# Patient Record
Sex: FEMALE | Race: OTHER | Employment: UNEMPLOYED | URBAN - METROPOLITAN AREA
[De-identification: names, ages, dates, MRNs, and addresses within clinical notes are randomized per-mention and may not be internally consistent; named-entity substitution may affect disease eponyms.]

---

## 2024-03-12 ENCOUNTER — APPOINTMENT (OUTPATIENT)
Dept: ULTRASOUND IMAGING | Age: 19
End: 2024-03-12
Payer: COMMERCIAL

## 2024-03-12 ENCOUNTER — HOSPITAL ENCOUNTER (OUTPATIENT)
Age: 19
Discharge: HOME | End: 2024-03-12
Attending: OBSTETRICS & GYNECOLOGY | Admitting: OBSTETRICS & GYNECOLOGY
Payer: COMMERCIAL

## 2024-03-12 VITALS
RESPIRATION RATE: 18 BRPM | TEMPERATURE: 98.4 F | SYSTOLIC BLOOD PRESSURE: 124 MMHG | DIASTOLIC BLOOD PRESSURE: 66 MMHG | HEART RATE: 85 BPM | OXYGEN SATURATION: 98 %

## 2024-03-12 DIAGNOSIS — O46.90 VAGINAL BLEEDING DURING PREGNANCY: Primary | ICD-10-CM

## 2024-03-12 LAB
ABO/RH: NORMAL
ANTIBODY SCREEN: NEGATIVE
GONADOTROPIN, CHORIONIC (HCG) QUANT: NORMAL UIU/ML

## 2024-03-12 PROCEDURE — 99214 OFFICE O/P EST MOD 30 MIN: CPT | Performed by: ADVANCED PRACTICE MIDWIFE

## 2024-03-12 PROCEDURE — 84702 CHORIONIC GONADOTROPIN TEST: CPT

## 2024-03-12 PROCEDURE — 86850 RBC ANTIBODY SCREEN: CPT

## 2024-03-12 PROCEDURE — 86901 BLOOD TYPING SEROLOGIC RH(D): CPT

## 2024-03-12 PROCEDURE — 99213 OFFICE O/P EST LOW 20 MIN: CPT

## 2024-03-12 PROCEDURE — 1220000000 HC SEMI PRIVATE OB R&B

## 2024-03-12 PROCEDURE — 86900 BLOOD TYPING SEROLOGIC ABO: CPT

## 2024-03-12 PROCEDURE — 93975 VASCULAR STUDY: CPT

## 2024-03-12 PROCEDURE — 76801 OB US < 14 WKS SINGLE FETUS: CPT

## 2024-03-12 PROCEDURE — 6370000000 HC RX 637 (ALT 250 FOR IP): Performed by: ADVANCED PRACTICE MIDWIFE

## 2024-03-12 RX ORDER — PNV NO.95/FERROUS FUM/FOLIC AC 28MG-0.8MG
1 TABLET ORAL DAILY
Qty: 30 TABLET | Refills: 3 | Status: SHIPPED | OUTPATIENT
Start: 2024-03-12

## 2024-03-12 RX ORDER — ACETAMINOPHEN 500 MG
1000 TABLET ORAL EVERY 8 HOURS PRN
Status: DISCONTINUED | OUTPATIENT
Start: 2024-03-12 | End: 2024-03-12 | Stop reason: HOSPADM

## 2024-03-12 RX ADMIN — ACETAMINOPHEN 1000 MG: 500 TABLET ORAL at 12:22

## 2024-03-12 NOTE — FLOWSHEET NOTE
Pt presents amb to unit for c/o vaginal bleeding that started yesterday. Pt states had strong period like cramping yesterday with bleeding and clots. Bleeding isnt as heavy today. Denies c/o cramping today only has headache. Has had US done in [place]. POC discussed. Alma WARD notified of pt arrival.

## 2024-03-12 NOTE — FLOWSHEET NOTE
Discharge instructions given and explained. Instructed to follow up with the [facility]. Information given with address and phone number for [facility]. PT to return for increased vaginal bleeding. Pt verbalized understanding and left amb from unit for home without distress.

## 2024-03-12 NOTE — CONSULTS
Session ID: [id number]  Language: Divehi   ID: #[id number]   Name: Shmuel      MAYTE malone. Notified waiting on US results. States may not have  for much longer. Will notify CNM.

## 2024-03-12 NOTE — PROGRESS NOTES
hemorrhage.     ASSESSMENT:       17 yo  at 9.4 weeks  Rh +   Subchorionic hematoma   Vaginal bleeding in pregnancy    PLAN:    Discussed cause of bleeding was WARNER and recommended pelvic rest and f/u u/s in 1 month.     Resources given for [facility]. Prenatal vitamin sent to pharmacy.     Reviewed bleeding precautions. Pt to return to the unit if she develops severe abdominal pain or heavy bleeding saturating a pad/hr or passing clots.     I have collaborated and updated Dr Boyd  and the MD agrees with the current POC.     JUSTIN Hdz CNM

## 2024-03-12 NOTE — DISCHARGE INSTRUCTIONS
Hematoma subcoriónico: Instrucciones de cuidado  Subchorionic Hematoma: Care Instructions  Instrucciones de cuidado     Un hematoma subcoriónico, o hemorragia subcoriónica, es sangrado bajo zita de las membranas (corion) que rodea al embrión dentro del útero. Es zita causa común de sangrado en los comienzos del embarazo.  El principal síntoma es sangrado vaginal. Huyen algunas mujeres no tienen síntomas. Pueden enterarse de que tienen un hematoma isra zita ecografía.  En la mayoría de los casos, el sangrado se va por sí solo. La mayoría de las mujeres terminan teniendo un bebé jose a. Huyen, en algunos casos, el sangrado es zita señal de aborto espontáneo o de otro problema con el embarazo. Es posible que bryant médico quiera hacerle zita ecografía de seguimiento.  La atención de seguimiento es zita parte clave de bryant tratamiento y seguridad. Asegúrese de hacer y acudir a todas las citas, y llame a bryant médico si está teniendo problemas. También es zita buena idea saber los resultados de viola exámenes y mantener zita lista de los medicamentos que kathy.  ¿Cómo puede cuidarse en el hogar?  [place] nota de cualquier sangrado que tenga y siga las instrucciones sobre cuándo llamar a bryant médico.  Tenga en cuenta que tener algo de sangrado isra el primer trimestre o tener un resultado anormal en zita ecografía podría:  No causarles problemas ni a usted ni a bryant bebé.  Resultar ser algo más grave. Huyen si esto sucede, es mejor saberlo pronto. De bree modo, usted y bryant médico pueden manejar cualquier complicación más pronto que tarde.  ¿Cuándo debe pedir ayuda?   Llame al 911 en cualquier momento que considere que necesita atención de urgencia. Por ejemplo, llame si:    Tiene dolor repentino e intenso en el abdomen o en la pelvis.     Se desmayó (perdió el conocimiento).     Tiene sangrado vaginal intenso.   Llame a bryant médico ahora mismo o busque atención médica inmediata si:    Se siente mareada o aturdida, o siente que podría desmayarse.

## 2024-09-24 ENCOUNTER — INITIAL PRENATAL (OUTPATIENT)
Dept: OBGYN | Age: 19
End: 2024-09-24
Payer: COMMERCIAL

## 2024-09-24 VITALS
BODY MASS INDEX: 48.1 KG/M2 | WEIGHT: 245 LBS | SYSTOLIC BLOOD PRESSURE: 108 MMHG | DIASTOLIC BLOOD PRESSURE: 71 MMHG | HEIGHT: 60 IN

## 2024-09-24 DIAGNOSIS — Z98.891 HISTORY OF CESAREAN DELIVERY: ICD-10-CM

## 2024-09-24 DIAGNOSIS — O09.30 LATE PRENATAL CARE: ICD-10-CM

## 2024-09-24 DIAGNOSIS — O99.210 MATERNAL OBESITY, ANTEPARTUM: ICD-10-CM

## 2024-09-24 DIAGNOSIS — Z3A.37 37 WEEKS GESTATION OF PREGNANCY: Primary | ICD-10-CM

## 2024-09-24 DIAGNOSIS — Z87.59 HISTORY OF POSTPARTUM HEMORRHAGE: ICD-10-CM

## 2024-09-24 DIAGNOSIS — O09.93 HIGH-RISK PREGNANCY IN THIRD TRIMESTER: ICD-10-CM

## 2024-09-24 LAB — GLUCOSE 1H P 50 G GLC PO SERPL-MCNC: 126 MG/DL

## 2024-09-24 PROCEDURE — 99204 OFFICE O/P NEW MOD 45 MIN: CPT

## 2024-09-24 PROCEDURE — 36415 COLL VENOUS BLD VENIPUNCTURE: CPT

## 2024-09-24 SDOH — ECONOMIC STABILITY: FOOD INSECURITY: WITHIN THE PAST 12 MONTHS, THE FOOD YOU BOUGHT JUST DIDN'T LAST AND YOU DIDN'T HAVE MONEY TO GET MORE.: NEVER TRUE

## 2024-09-24 SDOH — ECONOMIC STABILITY: INCOME INSECURITY: HOW HARD IS IT FOR YOU TO PAY FOR THE VERY BASICS LIKE FOOD, HOUSING, MEDICAL CARE, AND HEATING?: NOT HARD AT ALL

## 2024-09-24 SDOH — ECONOMIC STABILITY: FOOD INSECURITY: WITHIN THE PAST 12 MONTHS, YOU WORRIED THAT YOUR FOOD WOULD RUN OUT BEFORE YOU GOT MONEY TO BUY MORE.: NEVER TRUE

## 2024-09-24 ASSESSMENT — PATIENT HEALTH QUESTIONNAIRE - PHQ9
SUM OF ALL RESPONSES TO PHQ9 QUESTIONS 1 & 2: 0
1. LITTLE INTEREST OR PLEASURE IN DOING THINGS: NOT AT ALL
SUM OF ALL RESPONSES TO PHQ QUESTIONS 1-9: 0
2. FEELING DOWN, DEPRESSED OR HOPELESS: NOT AT ALL
SUM OF ALL RESPONSES TO PHQ QUESTIONS 1-9: 0

## 2024-09-24 ASSESSMENT — ENCOUNTER SYMPTOMS
DIARRHEA: 0
CONSTIPATION: 0
SHORTNESS OF BREATH: 0
ABDOMINAL PAIN: 0
RESPIRATORY NEGATIVE: 1
NAUSEA: 0
CHEST TIGHTNESS: 0
VOMITING: 0
GASTROINTESTINAL NEGATIVE: 1

## 2024-09-26 ENCOUNTER — PREP FOR PROCEDURE (OUTPATIENT)
Dept: OBGYN | Age: 19
End: 2024-09-26

## 2024-09-26 DIAGNOSIS — Z98.891 S/P REPEAT LOW TRANSVERSE C-SECTION: ICD-10-CM

## 2024-09-28 LAB
GP B STREP SPEC QL CULT: ABNORMAL
ORGANISM: ABNORMAL

## 2024-10-01 ENCOUNTER — ROUTINE PRENATAL (OUTPATIENT)
Dept: OBGYN | Age: 19
End: 2024-10-01
Payer: COMMERCIAL

## 2024-10-01 DIAGNOSIS — O09.93 SUPERVISION OF HIGH-RISK PREGNANCY, THIRD TRIMESTER: Primary | ICD-10-CM

## 2024-10-01 DIAGNOSIS — O99.820 GROUP B STREPTOCOCCAL CARRIAGE COMPLICATING PREGNANCY: ICD-10-CM

## 2024-10-01 DIAGNOSIS — O09.33 LATE PRENATAL CARE AFFECTING PREGNANCY IN THIRD TRIMESTER: ICD-10-CM

## 2024-10-01 DIAGNOSIS — O34.219 PREVIOUS CESAREAN DELIVERY AFFECTING PREGNANCY: ICD-10-CM

## 2024-10-01 DIAGNOSIS — Z87.59 HISTORY OF POSTPARTUM HEMORRHAGE: ICD-10-CM

## 2024-10-01 DIAGNOSIS — O99.210 OBESITY IN PREGNANCY: ICD-10-CM

## 2024-10-01 DIAGNOSIS — Z3A.38 38 WEEKS GESTATION OF PREGNANCY: ICD-10-CM

## 2024-10-01 PROBLEM — Z28.39 RUBELLA NON-IMMUNE STATUS, ANTEPARTUM: Status: ACTIVE | Noted: 2024-10-01

## 2024-10-01 PROBLEM — O09.899 RUBELLA NON-IMMUNE STATUS, ANTEPARTUM: Status: ACTIVE | Noted: 2024-10-01

## 2024-10-01 PROCEDURE — 99213 OFFICE O/P EST LOW 20 MIN: CPT | Performed by: STUDENT IN AN ORGANIZED HEALTH CARE EDUCATION/TRAINING PROGRAM

## 2024-10-01 NOTE — PROGRESS NOTES
Department of Obstetrics and Gynecology  Routine Prenatal Office Visit  Name:  Nena Howell   CSN: [id number]    : 2005   Age: 19 y.o.        Chief Complaint   Patient presents with    Routine Prenatal Visit     No c/o. U/s and pre-op today. Pt has questions regarding  vs . Last  in [facility].       EDC: Estimated Date of Delivery: 10/11/24     Nena Howell is a 19 y.o.  at 38w4d     Subjective : Patient doing well without complaints.    + Fetal movement.     Negative headache, negative vision changes, negative right upper quadrant pain, negative edema, positive fetal movement, negative contractions, negative vaginal bleeding, negative leakage of fluid. Pt denies nausea/vomiting and calf pain.    Objective :    There were no vitals taken for this visit.        Physical Exam:  General Appearance: Alert and oriented. No acute distress  Gastrointestinal: Soft. Non-tender, non-distended. Gravid uterus.  Musculoskeletal: No significant lower extremity edema.    Fundal Height: 38 cm  Fetal Heart Tones: 128 bpm    ASSESSMENT/PLAN:     1. Nena Howell is a 19 y.o.  at 38w4d     Diagnosis Orders   1. Supervision of high-risk pregnancy, third trimester        2. 38 weeks gestation of pregnancy        3. Obesity in pregnancy        4. Previous  delivery affecting pregnancy        5. Late prenatal care affecting pregnancy in third trimester        6. History of postpartum hemorrhage        7. Group B streptococcal carriage complicating pregnancy             All OB labs and imaging reviewed  Recommend continuing and taking daily prenatal vitamin  Tylenol for episodic pain relief as needed  Hx of PLTCS in [facility] at 41w with postpartum hemorrhage  Questionable clubfoot seen on previous US, repeat US today  Desires RLTCS, risks and benefits extensively discussed between CS and TOLAC. She desires RLTCS. Consent signed. CS on 10/7 at 1000    Return to the office

## 2024-10-07 ENCOUNTER — HOSPITAL ENCOUNTER (INPATIENT)
Age: 19
LOS: 2 days | Discharge: HOME | DRG: 540 | End: 2024-10-09
Attending: STUDENT IN AN ORGANIZED HEALTH CARE EDUCATION/TRAINING PROGRAM | Admitting: STUDENT IN AN ORGANIZED HEALTH CARE EDUCATION/TRAINING PROGRAM
Payer: COMMERCIAL

## 2024-10-07 ENCOUNTER — ANESTHESIA (OUTPATIENT)
Dept: LABOR AND DELIVERY | Age: 19
DRG: 540 | End: 2024-10-07
Payer: COMMERCIAL

## 2024-10-07 ENCOUNTER — ANESTHESIA EVENT (OUTPATIENT)
Dept: LABOR AND DELIVERY | Age: 19
DRG: 540 | End: 2024-10-07
Payer: COMMERCIAL

## 2024-10-07 DIAGNOSIS — O34.219 PREVIOUS CESAREAN DELIVERY AFFECTING PREGNANCY: Primary | ICD-10-CM

## 2024-10-07 PROBLEM — Z3A.39 39 WEEKS GESTATION OF PREGNANCY: Status: ACTIVE | Noted: 2024-10-07

## 2024-10-07 PROBLEM — O46.90 VAGINAL BLEEDING DURING PREGNANCY: Status: RESOLVED | Noted: 2024-03-12 | Resolved: 2024-10-07

## 2024-10-07 LAB
ABO + RH BLD: NORMAL
BLOOD BANK COMMENT: NORMAL
BLOOD BANK SAMPLE EXPIRATION: NORMAL
BLOOD GROUP ANTIBODIES SERPL: NEGATIVE
ERYTHROCYTE [DISTWIDTH] IN BLOOD BY AUTOMATED COUNT: 14.4 % (ref 11.7–14.9)
HCT VFR BLD AUTO: 38 % (ref 37–47)
HGB BLD-MCNC: 12.3 G/DL (ref 12.5–16)
MCH RBC QN AUTO: 27.8 PG (ref 27–31)
MCHC RBC AUTO-ENTMCNC: 32.4 G/DL (ref 32–36)
MCV RBC AUTO: 86 FL (ref 78–100)
PLATELET # BLD AUTO: 337 K/UL (ref 140–440)
PMV BLD AUTO: 10.1 FL (ref 7.5–11.1)
RBC # BLD AUTO: 4.42 M/UL (ref 4.2–5.4)
WBC OTHER # BLD: 10 K/UL (ref 4–10.5)

## 2024-10-07 PROCEDURE — 2580000003 HC RX 258: Performed by: ADVANCED PRACTICE MIDWIFE

## 2024-10-07 PROCEDURE — 86900 BLOOD TYPING SEROLOGIC ABO: CPT

## 2024-10-07 PROCEDURE — 6360000002 HC RX W HCPCS

## 2024-10-07 PROCEDURE — 2709999900 HC NON-CHARGEABLE SUPPLY: Performed by: STUDENT IN AN ORGANIZED HEALTH CARE EDUCATION/TRAINING PROGRAM

## 2024-10-07 PROCEDURE — 6360000002 HC RX W HCPCS: Performed by: STUDENT IN AN ORGANIZED HEALTH CARE EDUCATION/TRAINING PROGRAM

## 2024-10-07 PROCEDURE — 3609079900 HC CESAREAN SECTION: Performed by: STUDENT IN AN ORGANIZED HEALTH CARE EDUCATION/TRAINING PROGRAM

## 2024-10-07 PROCEDURE — 6370000000 HC RX 637 (ALT 250 FOR IP): Performed by: STUDENT IN AN ORGANIZED HEALTH CARE EDUCATION/TRAINING PROGRAM

## 2024-10-07 PROCEDURE — 7100000000 HC PACU RECOVERY - FIRST 15 MIN: Performed by: STUDENT IN AN ORGANIZED HEALTH CARE EDUCATION/TRAINING PROGRAM

## 2024-10-07 PROCEDURE — 6360000002 HC RX W HCPCS: Performed by: ADVANCED PRACTICE MIDWIFE

## 2024-10-07 PROCEDURE — 86901 BLOOD TYPING SEROLOGIC RH(D): CPT

## 2024-10-07 PROCEDURE — 3700000000 HC ANESTHESIA ATTENDED CARE: Performed by: STUDENT IN AN ORGANIZED HEALTH CARE EDUCATION/TRAINING PROGRAM

## 2024-10-07 PROCEDURE — 6370000000 HC RX 637 (ALT 250 FOR IP): Performed by: ADVANCED PRACTICE MIDWIFE

## 2024-10-07 PROCEDURE — 1220000000 HC SEMI PRIVATE OB R&B

## 2024-10-07 PROCEDURE — 3700000001 HC ADD 15 MINUTES (ANESTHESIA): Performed by: STUDENT IN AN ORGANIZED HEALTH CARE EDUCATION/TRAINING PROGRAM

## 2024-10-07 PROCEDURE — 86850 RBC ANTIBODY SCREEN: CPT

## 2024-10-07 PROCEDURE — 2500000003 HC RX 250 WO HCPCS: Performed by: ADVANCED PRACTICE MIDWIFE

## 2024-10-07 PROCEDURE — 2500000003 HC RX 250 WO HCPCS

## 2024-10-07 PROCEDURE — 59514 CESAREAN DELIVERY ONLY: CPT | Performed by: STUDENT IN AN ORGANIZED HEALTH CARE EDUCATION/TRAINING PROGRAM

## 2024-10-07 PROCEDURE — 7100000001 HC PACU RECOVERY - ADDTL 15 MIN: Performed by: STUDENT IN AN ORGANIZED HEALTH CARE EDUCATION/TRAINING PROGRAM

## 2024-10-07 PROCEDURE — APPNB30 APP NON BILLABLE TIME 0-30 MINS: Performed by: ADVANCED PRACTICE MIDWIFE

## 2024-10-07 PROCEDURE — 85027 COMPLETE CBC AUTOMATED: CPT

## 2024-10-07 RX ORDER — ONDANSETRON 4 MG/1
4 TABLET, ORALLY DISINTEGRATING ORAL EVERY 8 HOURS PRN
Status: DISCONTINUED | OUTPATIENT
Start: 2024-10-07 | End: 2024-10-09 | Stop reason: HOSPADM

## 2024-10-07 RX ORDER — ACETAMINOPHEN 325 MG/1
975 TABLET ORAL ONCE
Status: COMPLETED | OUTPATIENT
Start: 2024-10-07 | End: 2024-10-07

## 2024-10-07 RX ORDER — KETOROLAC TROMETHAMINE 30 MG/ML
30 INJECTION, SOLUTION INTRAMUSCULAR; INTRAVENOUS EVERY 6 HOURS
Status: DISPENSED | OUTPATIENT
Start: 2024-10-07 | End: 2024-10-08

## 2024-10-07 RX ORDER — DIPHENHYDRAMINE HCL 25 MG
25 TABLET ORAL EVERY 6 HOURS PRN
Status: DISCONTINUED | OUTPATIENT
Start: 2024-10-07 | End: 2024-10-09 | Stop reason: HOSPADM

## 2024-10-07 RX ORDER — FAMOTIDINE 10 MG/ML
20 INJECTION, SOLUTION INTRAVENOUS ONCE
Status: COMPLETED | OUTPATIENT
Start: 2024-10-07 | End: 2024-10-07

## 2024-10-07 RX ORDER — NALOXONE HYDROCHLORIDE 0.4 MG/ML
INJECTION, SOLUTION INTRAMUSCULAR; INTRAVENOUS; SUBCUTANEOUS PRN
Status: DISCONTINUED | OUTPATIENT
Start: 2024-10-07 | End: 2024-10-08

## 2024-10-07 RX ORDER — SODIUM CHLORIDE 0.9 % (FLUSH) 0.9 %
10 SYRINGE (ML) INJECTION PRN
Status: DISCONTINUED | OUTPATIENT
Start: 2024-10-07 | End: 2024-10-08

## 2024-10-07 RX ORDER — DIPHENHYDRAMINE HYDROCHLORIDE 50 MG/ML
25 INJECTION INTRAMUSCULAR; INTRAVENOUS EVERY 6 HOURS PRN
Status: DISCONTINUED | OUTPATIENT
Start: 2024-10-07 | End: 2024-10-07 | Stop reason: SDUPTHER

## 2024-10-07 RX ORDER — FAMOTIDINE 20 MG/1
20 TABLET, FILM COATED ORAL 2 TIMES DAILY
Status: DISCONTINUED | OUTPATIENT
Start: 2024-10-07 | End: 2024-10-09 | Stop reason: HOSPADM

## 2024-10-07 RX ORDER — ONDANSETRON 2 MG/ML
4 INJECTION INTRAMUSCULAR; INTRAVENOUS EVERY 6 HOURS PRN
Status: DISCONTINUED | OUTPATIENT
Start: 2024-10-07 | End: 2024-10-07 | Stop reason: SDUPTHER

## 2024-10-07 RX ORDER — ONDANSETRON 2 MG/ML
INJECTION INTRAMUSCULAR; INTRAVENOUS
Status: DISCONTINUED | OUTPATIENT
Start: 2024-10-07 | End: 2024-10-07 | Stop reason: SDUPTHER

## 2024-10-07 RX ORDER — SODIUM CHLORIDE, SODIUM LACTATE, POTASSIUM CHLORIDE, AND CALCIUM CHLORIDE .6; .31; .03; .02 G/100ML; G/100ML; G/100ML; G/100ML
1000 INJECTION, SOLUTION INTRAVENOUS ONCE
Status: COMPLETED | OUTPATIENT
Start: 2024-10-07 | End: 2024-10-07

## 2024-10-07 RX ORDER — SODIUM CHLORIDE 0.9 % (FLUSH) 0.9 %
5-40 SYRINGE (ML) INJECTION PRN
Status: DISCONTINUED | OUTPATIENT
Start: 2024-10-07 | End: 2024-10-09 | Stop reason: HOSPADM

## 2024-10-07 RX ORDER — BUPIVACAINE HYDROCHLORIDE 7.5 MG/ML
INJECTION, SOLUTION INTRASPINAL
Status: DISCONTINUED | OUTPATIENT
Start: 2024-10-07 | End: 2024-10-07 | Stop reason: SDUPTHER

## 2024-10-07 RX ORDER — ACETAMINOPHEN 500 MG
1000 TABLET ORAL EVERY 8 HOURS
Status: DISCONTINUED | OUTPATIENT
Start: 2024-10-07 | End: 2024-10-09 | Stop reason: HOSPADM

## 2024-10-07 RX ORDER — METOCLOPRAMIDE HYDROCHLORIDE 5 MG/ML
10 INJECTION INTRAMUSCULAR; INTRAVENOUS EVERY 6 HOURS
Status: CANCELLED | OUTPATIENT
Start: 2024-10-07

## 2024-10-07 RX ORDER — KETOROLAC TROMETHAMINE 30 MG/ML
INJECTION, SOLUTION INTRAMUSCULAR; INTRAVENOUS
Status: DISCONTINUED | OUTPATIENT
Start: 2024-10-07 | End: 2024-10-07 | Stop reason: SDUPTHER

## 2024-10-07 RX ORDER — DIPHENHYDRAMINE HCL 25 MG
25 TABLET ORAL EVERY 6 HOURS PRN
Status: DISCONTINUED | OUTPATIENT
Start: 2024-10-07 | End: 2024-10-07 | Stop reason: SDUPTHER

## 2024-10-07 RX ORDER — SODIUM CHLORIDE 0.9 % (FLUSH) 0.9 %
10 SYRINGE (ML) INJECTION EVERY 12 HOURS SCHEDULED
Status: DISCONTINUED | OUTPATIENT
Start: 2024-10-07 | End: 2024-10-08

## 2024-10-07 RX ORDER — FENTANYL CITRATE 50 UG/ML
INJECTION, SOLUTION INTRAMUSCULAR; INTRAVENOUS
Status: DISCONTINUED | OUTPATIENT
Start: 2024-10-07 | End: 2024-10-07 | Stop reason: SDUPTHER

## 2024-10-07 RX ORDER — IBUPROFEN 800 MG/1
800 TABLET, FILM COATED ORAL EVERY 8 HOURS
Status: DISCONTINUED | OUTPATIENT
Start: 2024-10-08 | End: 2024-10-09 | Stop reason: HOSPADM

## 2024-10-07 RX ORDER — OXYCODONE HYDROCHLORIDE 5 MG/1
5 TABLET ORAL EVERY 4 HOURS PRN
Status: DISCONTINUED | OUTPATIENT
Start: 2024-10-07 | End: 2024-10-09 | Stop reason: HOSPADM

## 2024-10-07 RX ORDER — DEXAMETHASONE SODIUM PHOSPHATE 4 MG/ML
INJECTION, SOLUTION INTRA-ARTICULAR; INTRALESIONAL; INTRAMUSCULAR; INTRAVENOUS; SOFT TISSUE
Status: DISCONTINUED | OUTPATIENT
Start: 2024-10-07 | End: 2024-10-07 | Stop reason: SDUPTHER

## 2024-10-07 RX ORDER — SODIUM CHLORIDE, SODIUM LACTATE, POTASSIUM CHLORIDE, CALCIUM CHLORIDE 600; 310; 30; 20 MG/100ML; MG/100ML; MG/100ML; MG/100ML
INJECTION, SOLUTION INTRAVENOUS CONTINUOUS
Status: DISCONTINUED | OUTPATIENT
Start: 2024-10-07 | End: 2024-10-08

## 2024-10-07 RX ORDER — EPHEDRINE SULFATE 50 MG/ML
INJECTION INTRAVENOUS
Status: DISCONTINUED | OUTPATIENT
Start: 2024-10-07 | End: 2024-10-07 | Stop reason: SDUPTHER

## 2024-10-07 RX ORDER — CITRIC ACID/SODIUM CITRATE 334-500MG
30 SOLUTION, ORAL ORAL ONCE
Status: COMPLETED | OUTPATIENT
Start: 2024-10-07 | End: 2024-10-07

## 2024-10-07 RX ORDER — MORPHINE SULFATE 0.5 MG/ML
INJECTION, SOLUTION EPIDURAL; INTRATHECAL; INTRAVENOUS
Status: DISCONTINUED | OUTPATIENT
Start: 2024-10-07 | End: 2024-10-07 | Stop reason: SDUPTHER

## 2024-10-07 RX ORDER — SODIUM CHLORIDE 9 MG/ML
INJECTION, SOLUTION INTRAVENOUS PRN
Status: DISCONTINUED | OUTPATIENT
Start: 2024-10-07 | End: 2024-10-08

## 2024-10-07 RX ORDER — ONDANSETRON 2 MG/ML
4 INJECTION INTRAMUSCULAR; INTRAVENOUS EVERY 6 HOURS PRN
Status: DISCONTINUED | OUTPATIENT
Start: 2024-10-07 | End: 2024-10-09 | Stop reason: HOSPADM

## 2024-10-07 RX ORDER — DIPHENHYDRAMINE HYDROCHLORIDE 50 MG/ML
25 INJECTION INTRAMUSCULAR; INTRAVENOUS EVERY 6 HOURS PRN
Status: DISCONTINUED | OUTPATIENT
Start: 2024-10-07 | End: 2024-10-09 | Stop reason: HOSPADM

## 2024-10-07 RX ORDER — SODIUM CHLORIDE 9 MG/ML
INJECTION, SOLUTION INTRAVENOUS PRN
Status: DISCONTINUED | OUTPATIENT
Start: 2024-10-07 | End: 2024-10-09 | Stop reason: HOSPADM

## 2024-10-07 RX ORDER — ENOXAPARIN SODIUM 100 MG/ML
40 INJECTION SUBCUTANEOUS DAILY
Status: DISCONTINUED | OUTPATIENT
Start: 2024-10-08 | End: 2024-10-09 | Stop reason: HOSPADM

## 2024-10-07 RX ORDER — OXYCODONE HYDROCHLORIDE 5 MG/1
10 TABLET ORAL EVERY 4 HOURS PRN
Status: DISCONTINUED | OUTPATIENT
Start: 2024-10-07 | End: 2024-10-09 | Stop reason: HOSPADM

## 2024-10-07 RX ORDER — DOCUSATE SODIUM 100 MG/1
100 CAPSULE, LIQUID FILLED ORAL 2 TIMES DAILY
Status: DISCONTINUED | OUTPATIENT
Start: 2024-10-07 | End: 2024-10-09 | Stop reason: HOSPADM

## 2024-10-07 RX ORDER — SODIUM CHLORIDE 0.9 % (FLUSH) 0.9 %
5-40 SYRINGE (ML) INJECTION EVERY 12 HOURS SCHEDULED
Status: DISCONTINUED | OUTPATIENT
Start: 2024-10-07 | End: 2024-10-09 | Stop reason: HOSPADM

## 2024-10-07 RX ADMIN — ACETAMINOPHEN 1000 MG: 500 TABLET ORAL at 20:03

## 2024-10-07 RX ADMIN — DIPHENHYDRAMINE HYDROCHLORIDE 12.5 MG: 50 INJECTION, SOLUTION INTRAMUSCULAR; INTRAVENOUS at 11:27

## 2024-10-07 RX ADMIN — KETOROLAC TROMETHAMINE 30 MG: 30 INJECTION, SOLUTION INTRAMUSCULAR; INTRAVENOUS at 17:45

## 2024-10-07 RX ADMIN — ONDANSETRON 4 MG: 2 INJECTION INTRAMUSCULAR; INTRAVENOUS at 14:59

## 2024-10-07 RX ADMIN — EPHEDRINE SULFATE 10 MG: 50 INJECTION INTRAVENOUS at 10:37

## 2024-10-07 RX ADMIN — EPHEDRINE SULFATE 5 MG: 50 INJECTION INTRAVENOUS at 10:51

## 2024-10-07 RX ADMIN — SODIUM CHLORIDE, POTASSIUM CHLORIDE, SODIUM LACTATE AND CALCIUM CHLORIDE: 600; 310; 30; 20 INJECTION, SOLUTION INTRAVENOUS at 17:23

## 2024-10-07 RX ADMIN — DEXAMETHASONE SODIUM PHOSPHATE 4 MG: 4 INJECTION, SOLUTION INTRAMUSCULAR; INTRAVENOUS at 11:04

## 2024-10-07 RX ADMIN — DOCUSATE SODIUM 100 MG: 100 CAPSULE, LIQUID FILLED ORAL at 20:05

## 2024-10-07 RX ADMIN — FAMOTIDINE 20 MG: 10 INJECTION, SOLUTION INTRAVENOUS at 09:53

## 2024-10-07 RX ADMIN — KETOROLAC TROMETHAMINE 30 MG: 30 INJECTION, SOLUTION INTRAMUSCULAR; INTRAVENOUS at 11:25

## 2024-10-07 RX ADMIN — Medication 87.3 MILLI-UNITS/MIN: at 11:07

## 2024-10-07 RX ADMIN — ONDANSETRON 4 MG: 2 INJECTION INTRAMUSCULAR; INTRAVENOUS at 09:53

## 2024-10-07 RX ADMIN — FAMOTIDINE 20 MG: 20 TABLET ORAL at 20:05

## 2024-10-07 RX ADMIN — EPHEDRINE SULFATE 5 MG: 50 INJECTION INTRAVENOUS at 10:49

## 2024-10-07 RX ADMIN — FENTANYL CITRATE 15 MCG: 50 INJECTION, SOLUTION INTRAMUSCULAR; INTRAVENOUS at 10:29

## 2024-10-07 RX ADMIN — DIPHENHYDRAMINE HYDROCHLORIDE 25 MG: 25 TABLET ORAL at 21:41

## 2024-10-07 RX ADMIN — ACETAMINOPHEN 975 MG: 325 TABLET ORAL at 09:54

## 2024-10-07 RX ADMIN — SODIUM CHLORIDE, POTASSIUM CHLORIDE, SODIUM LACTATE AND CALCIUM CHLORIDE: 600; 310; 30; 20 INJECTION, SOLUTION INTRAVENOUS at 08:05

## 2024-10-07 RX ADMIN — ONDANSETRON 4 MG: 2 INJECTION INTRAMUSCULAR; INTRAVENOUS at 10:31

## 2024-10-07 RX ADMIN — SODIUM CHLORIDE, POTASSIUM CHLORIDE, SODIUM LACTATE AND CALCIUM CHLORIDE 1000 ML: 600; 310; 30; 20 INJECTION, SOLUTION INTRAVENOUS at 08:02

## 2024-10-07 RX ADMIN — BUPIVACAINE HYDROCHLORIDE IN DEXTROSE 1.5 ML: 7.5 INJECTION, SOLUTION SUBARACHNOID at 10:29

## 2024-10-07 RX ADMIN — Medication 166.7 ML: at 10:56

## 2024-10-07 RX ADMIN — MORPHINE SULFATE 0.15 MG: 0.5 INJECTION, SOLUTION EPIDURAL; INTRATHECAL; INTRAVENOUS at 10:29

## 2024-10-07 RX ADMIN — WATER 2000 MG: 1 INJECTION INTRAMUSCULAR; INTRAVENOUS; SUBCUTANEOUS at 09:54

## 2024-10-07 RX ADMIN — SODIUM CITRATE AND CITRIC ACID MONOHYDRATE 30 ML: 500; 334 SOLUTION ORAL at 09:53

## 2024-10-07 ASSESSMENT — PAIN DESCRIPTION - LOCATION
LOCATION: ABDOMEN
LOCATION: INCISION

## 2024-10-07 ASSESSMENT — PAIN DESCRIPTION - DESCRIPTORS
DESCRIPTORS: ACHING;SORE
DESCRIPTORS: SHARP;SORE

## 2024-10-07 ASSESSMENT — PAIN DESCRIPTION - ORIENTATION
ORIENTATION: MID;LOWER
ORIENTATION: LOWER

## 2024-10-07 ASSESSMENT — PAIN SCALES - GENERAL
PAINLEVEL_OUTOF10: 3
PAINLEVEL_OUTOF10: 5

## 2024-10-07 ASSESSMENT — PAIN - FUNCTIONAL ASSESSMENT: PAIN_FUNCTIONAL_ASSESSMENT: ACTIVITIES ARE NOT PREVENTED

## 2024-10-07 NOTE — ANESTHESIA PRE PROCEDURE
Department of Anesthesiology  Preprocedure Note       Name:  Nena Howell   Age:  19 y.o.  :  2005                                          MRN:  [id number]         Date:  10/7/2024      Surgeon: Surgeon(s):  Katlin Haynes DO    Procedure: Procedure(s):   SECTION    Medications prior to admission:   Prior to Admission medications    Medication Sig Start Date End Date Taking? Authorizing Provider   Prenatal Vit-Fe Fumarate-FA (PRENATAL VITAMINS) 28-0.8 MG TABS Take 1 tablet by mouth daily 3/12/24  Yes Alma Uriostegui APRN - CNM       Current medications:    Current Facility-Administered Medications   Medication Dose Route Frequency Provider Last Rate Last Admin   • lactated ringers IV soln infusion   IntraVENous Continuous Alma Uriostegui APRN - CNM       • sodium chloride flush 0.9 % injection 10 mL  10 mL IntraVENous 2 times per day Alma Uriostegui APRN - CNM       • sodium chloride flush 0.9 % injection 10 mL  10 mL IntraVENous PRN Alma Uriostegui APRN - CNM       • 0.9 % sodium chloride infusion   IntraVENous PRN Alma Uriostegui APRN - CNM       • citric acid-sodium citrate (BICITRA) solution 30 mL  30 mL Oral Once Alma Uriostegui APRN - CNM       • famotidine (PEPCID) injection 20 mg  20 mg IntraVENous Once Alma Uriostegui APRN - CNM       • acetaminophen (TYLENOL) tablet 975 mg  975 mg Oral Once Alma Uriostegui APRN - CNM       • oxytocin (PITOCIN) 30 units in 500 mL infusion  87.3 abner-units/min IntraVENous Continuous PRN Alma Uriostegui APRN - CNM        And   • oxytocin (PITOCIN) 10 unit bolus from the bag  10 Units IntraVENous PRN Alma Uriostegui APRN - CNM       • ondansetron (ZOFRAN) injection 4 mg  4 mg IntraVENous Q6H PRN Alma Uriostegui APRN - CNM       • ceFAZolin (ANCEF) 2,000 mg in sterile water 20 mL IV syringe  2,000 mg IntraVENous Once Alma Uriostegui APRN - CNM           Allergies:  No Known Allergies    Problem List:    Patient Active Problem List   Diagnosis

## 2024-10-07 NOTE — CARE COORDINATION
LSW received consult due to mother of baby having adoption plan. LSW met with adoptive parents, Delaney and Osbaldo Miles. Adoptive parents are from [place]. LSW met with mother of baby at bedside in triage before scheduled . Mother of baby is from [facility] and does not speak English. LSW used  over the phone. LSW had mother of baby sign Birth Mother Agreement with Adoptive Couple, gave to mother of baby's RN to place on infant's chart. Mother of baby agreed to have adoptive parents stay in a room as a hotel stay. LSW offered this option to adoptive parents, if there is room available. Mother of baby stated that she is doing this adoption plan of her own free will and feels safe at home.     Joy LEON spoke with mother of baby's . Mother of baby's  is emailing the paperwork that mother of baby signed prior to admission. LSW to follow.

## 2024-10-07 NOTE — PLAN OF CARE
Problem: Vaginal Birth or  Section  Goal: Fetal and maternal status remain reassuring during the birth process  Description:  Birth OB-Pregnancy care plan goal which identifies if the fetal and maternal status remain reassuring during the birth process  Outcome: Progressing     Problem: Postpartum  Goal: Experiences normal postpartum course  Description:  Postpartum OB-Pregnancy care plan goal which identifies if the mother is experiencing a normal postpartum course  Outcome: Progressing  Goal: Incisions, wounds, or drain sites healing without S/S of infection  Outcome: Progressing     Problem: Pain  Goal: Verbalizes/displays adequate comfort level or baseline comfort level  Outcome: Progressing     Problem: Infection - Adult  Goal: Absence of infection at discharge  Outcome: Progressing  Goal: Absence of infection during hospitalization  Outcome: Progressing  Goal: Absence of fever/infection during anticipated neutropenic period  Outcome: Progressing     Problem: Safety - Adult  Goal: Free from fall injury  Outcome: Progressing     Problem: Discharge Planning  Goal: Discharge to home or other facility with appropriate resources  Outcome: Progressing     Problem: Chronic Conditions and Co-morbidities  Goal: Patient's chronic conditions and co-morbidity symptoms are monitored and maintained or improved  Outcome: Progressing

## 2024-10-07 NOTE — ANESTHESIA PROCEDURE NOTES
Spinal Block    Patient location during procedure: OR  End time: 10/7/2024 10:29 AM  Reason for block: primary anesthetic  Staffing  Performed: resident/CRNA   Resident/CRNA: Roel Young APRN - CRNA  Performed by: Roel Young APRN - CRNA  Authorized by: Roel Young APRN - CRNA    Spinal Block  Patient position: sitting  Prep: ChloraPrep and site prepped and draped  Patient monitoring: cardiac monitor, continuous pulse ox and frequent blood pressure checks  Approach: midline  Location: L3/L4  Provider prep: mask, sterile gloves and sterile gown  Local infiltration: lidocaine  Needle  Needle type: Pencan   Needle gauge: 25 G  Needle length: 3.5 in  Assessment  Sensory level: T4  Swirl obtained: Yes  CSF: clear  Attempts: 1  Hemodynamics: stable  Preanesthetic Checklist  Completed: patient identified, IV checked, site marked, risks and benefits discussed, surgical/procedural consents, equipment checked, pre-op evaluation, timeout performed, anesthesia consent given, oxygen available, monitors applied/VS acknowledged, fire risk safety assessment completed and verbalized and blood product R/B/A discussed and consented

## 2024-10-07 NOTE — H&P
Department of Obstetrics and Gynecology   Obstetrics History and Physical        CHIEF COMPLAINT: No chief complaint on file.        HISTORY OF PRESENT ILLNESS:      The patient is a 19 y.o.  at 39w3d.  OB History          3    Para   1    Term   1            AB   1    Living   1         SAB   1    IAB        Ectopic        Molar        Multiple        Live Births   1            Patient presents with a chief complaint as above and is being admitted for   without tubal ligation    Estimated Due Date: Estimated Date of Delivery: 10/11/24    PRENATAL CARE:    Complicated by: adoption plan in place, scant prenatal care (first visit at 37 weeks), hx of c/s with PPH, questionable clubfoot on u/s, BMI 47    PAST OB HISTORY  OB History          3    Para   1    Term   1            AB   1    Living   1         SAB   1    IAB        Ectopic        Molar        Multiple        Live Births   1                Past Medical History:        Diagnosis Date    Missed      Obesity     Postpartum hemorrhage     Pregnant      Past Surgical History:        Procedure Laterality Date     SECTION      DILATION AND CURETTAGE OF UTERUS      FACIAL SURGERY      GALLBLADDER SURGERY       Allergies:  Patient has no known allergies.  Social History:    Social History     Socioeconomic History    Marital status: Single     Spouse name: Not on file    Number of children: Not on file    Years of education: Not on file    Highest education level: Not on file   Occupational History    Not on file   Tobacco Use    Smoking status: Never    Smokeless tobacco: Never   Vaping Use    Vaping status: Never Used   Substance and Sexual Activity    Alcohol use: Never    Drug use: Never    Sexual activity: Yes     Partners: Male   Other Topics Concern    Not on file   Social History Narrative    Not on file     Social Determinants of Health     Financial Resource Strain: Low Risk  (2024)    Overall

## 2024-10-07 NOTE — OP NOTE
Department of Obstetrics and Gynecology  Labor and Delivery  Operative Report  Name:  Nena Howell   CSN: [id number]   Attending Provider: Katlin Haynes DO  Location:  1/MR1-A   : 2005   Age: 19 y.o.     Section Operative Report    Date of surgery:   10/7/2024      Surgeon:   Katlin Haynes DO    First Assistant:   Alison Walters RN    Pre-op Diagnosis:   1. 39w3d IUP  2. Previous  section  3. Limited prenatal care  4. Hx of postpartum hemorrhage  5. Obesity in pregnancy  6. Possible clubfoot on US    Post-op Diagnosis:   1. 39w3d IUP  2. Previous  section  3. Limited prenatal care  4. Hx of postpartum hemorrhage  5. Obesity in pregnancy  6. Possible clubfoot on US  7. Live female infant, weight pending skin to skin      Procedure:   RLTCS    Complications:   None    Anesthesia:   Spinal    QBL:  462 cc    Specimen:  Cord blood    Indication:   The patient is a 19 y.o.  @ 39w3d presented for scheduled repeat  section.    Findings: Uterus, tubes and ovaries consistent with normal gravid state. Tubes and ovaries freely movable without evidence of adhesions or endometriosis      Procedure in Detail: After discussing the risks and benefits with the patient including but not limited to damage to bowel, bladder, blood vessels, and the ureters, as well as DVT/PE, the patient was consented and taken to the operating room.  Pre-operative antibiotics were given, spinal anesthesia was placed, and a ca catheter was inserted which was draining clear urine.  The patient was then prepped and draped in the normal sterile manner in the supine position with a leftward tilt.  Anesthesia was then noted to be adequate. A pfannensteil skin incision was created sharply two finger breadths above the pubic symphysis and incision was carried down to the underlying layer of fascia with the bovie.  The fascia was incised in the midline and the incision was extended laterally with the Bhupinder

## 2024-10-07 NOTE — FLOWSHEET NOTE
Patient arrived ambulatory to BC for scheduled . Adoptive parents with patient. Patient denies leaking of fluids, oriented to TR05 via  and asked to change into gown and provide urine sample.

## 2024-10-08 LAB
ERYTHROCYTE [DISTWIDTH] IN BLOOD BY AUTOMATED COUNT: 14.5 % (ref 11.7–14.9)
HCT VFR BLD AUTO: 32.3 % (ref 37–47)
HGB BLD-MCNC: 10.2 G/DL (ref 12.5–16)
MCH RBC QN AUTO: 27.3 PG (ref 27–31)
MCHC RBC AUTO-ENTMCNC: 31.6 G/DL (ref 32–36)
MCV RBC AUTO: 86.6 FL (ref 78–100)
PLATELET # BLD AUTO: 281 K/UL (ref 140–440)
PMV BLD AUTO: 9.9 FL (ref 7.5–11.1)
RBC # BLD AUTO: 3.73 M/UL (ref 4.2–5.4)
WBC OTHER # BLD: 12.4 K/UL (ref 4–10.5)

## 2024-10-08 PROCEDURE — 85027 COMPLETE CBC AUTOMATED: CPT

## 2024-10-08 PROCEDURE — 1220000000 HC SEMI PRIVATE OB R&B

## 2024-10-08 PROCEDURE — 94761 N-INVAS EAR/PLS OXIMETRY MLT: CPT

## 2024-10-08 PROCEDURE — 6360000002 HC RX W HCPCS: Performed by: STUDENT IN AN ORGANIZED HEALTH CARE EDUCATION/TRAINING PROGRAM

## 2024-10-08 PROCEDURE — APPNB30 APP NON BILLABLE TIME 0-30 MINS

## 2024-10-08 PROCEDURE — 6370000000 HC RX 637 (ALT 250 FOR IP): Performed by: STUDENT IN AN ORGANIZED HEALTH CARE EDUCATION/TRAINING PROGRAM

## 2024-10-08 PROCEDURE — 2580000003 HC RX 258: Performed by: ADVANCED PRACTICE MIDWIFE

## 2024-10-08 RX ADMIN — SODIUM CHLORIDE, PRESERVATIVE FREE 10 ML: 5 INJECTION INTRAVENOUS at 00:45

## 2024-10-08 RX ADMIN — FAMOTIDINE 20 MG: 20 TABLET ORAL at 20:49

## 2024-10-08 RX ADMIN — DOCUSATE SODIUM 100 MG: 100 CAPSULE, LIQUID FILLED ORAL at 07:41

## 2024-10-08 RX ADMIN — ACETAMINOPHEN 1000 MG: 500 TABLET ORAL at 07:42

## 2024-10-08 RX ADMIN — ENOXAPARIN SODIUM 40 MG: 100 INJECTION SUBCUTANEOUS at 07:41

## 2024-10-08 RX ADMIN — FAMOTIDINE 20 MG: 20 TABLET ORAL at 07:42

## 2024-10-08 RX ADMIN — OXYCODONE HYDROCHLORIDE 5 MG: 5 TABLET ORAL at 13:54

## 2024-10-08 RX ADMIN — KETOROLAC TROMETHAMINE 30 MG: 30 INJECTION, SOLUTION INTRAMUSCULAR; INTRAVENOUS at 07:40

## 2024-10-08 RX ADMIN — IBUPROFEN 800 MG: 800 TABLET, FILM COATED ORAL at 18:22

## 2024-10-08 RX ADMIN — DOCUSATE SODIUM 100 MG: 100 CAPSULE, LIQUID FILLED ORAL at 20:49

## 2024-10-08 RX ADMIN — KETOROLAC TROMETHAMINE 30 MG: 30 INJECTION, SOLUTION INTRAMUSCULAR; INTRAVENOUS at 00:44

## 2024-10-08 RX ADMIN — DIPHENHYDRAMINE HYDROCHLORIDE 25 MG: 25 TABLET ORAL at 13:54

## 2024-10-08 RX ADMIN — ACETAMINOPHEN 1000 MG: 500 TABLET ORAL at 23:53

## 2024-10-08 ASSESSMENT — PAIN DESCRIPTION - LOCATION
LOCATION: ABDOMEN

## 2024-10-08 ASSESSMENT — PAIN SCALES - GENERAL
PAINLEVEL_OUTOF10: 5
PAINLEVEL_OUTOF10: 3
PAINLEVEL_OUTOF10: 4
PAINLEVEL_OUTOF10: 3
PAINLEVEL_OUTOF10: 3

## 2024-10-08 ASSESSMENT — PAIN - FUNCTIONAL ASSESSMENT
PAIN_FUNCTIONAL_ASSESSMENT: ACTIVITIES ARE NOT PREVENTED

## 2024-10-08 ASSESSMENT — PAIN DESCRIPTION - ORIENTATION
ORIENTATION: LOWER;MID
ORIENTATION: LOWER

## 2024-10-08 ASSESSMENT — PAIN DESCRIPTION - DESCRIPTORS
DESCRIPTORS: DISCOMFORT
DESCRIPTORS: BURNING
DESCRIPTORS: CRAMPING;DISCOMFORT
DESCRIPTORS: SORE

## 2024-10-08 ASSESSMENT — PAIN DESCRIPTION - FREQUENCY: FREQUENCY: INTERMITTENT

## 2024-10-08 ASSESSMENT — PAIN DESCRIPTION - ONSET: ONSET: GRADUAL

## 2024-10-08 ASSESSMENT — PAIN DESCRIPTION - PAIN TYPE: TYPE: ACUTE PAIN;SURGICAL PAIN

## 2024-10-08 NOTE — CARE COORDINATION
Adoptive parents requested to speak with this LSW. LSW met with adoptive parents and infant at bedside. Adoptive mother stated that birth mother's  will be here today between 11 and 1 to sign the paperwork with the birth mom and adoptive parents. Birth mother's  has an  that he will bring. Adoptive mother stated that birth mother's  will not be able to come tomorrow, as he is in court all day, but would be able to come Thursday. LSW stated that birth mother would be able to stay until Thursday, as that is 4 days post . Birth mother's  will attempt to come in today. No other questions or needs stated at this time. LSW to remain available.

## 2024-10-08 NOTE — CARE COORDINATION
Mother of baby's  met with mother of baby and adoptive parents at bedside. Mother of baby signed all legal documents for adoption to be complete. LSW made copies of documents and placed them on infant's chart. Infant now okay to be discharged to adoptive parents once medically ready. LSW to follow.

## 2024-10-08 NOTE — PLAN OF CARE
Problem: Postpartum  Goal: Experiences normal postpartum course  Description:  Postpartum OB-Pregnancy care plan goal which identifies if the mother is experiencing a normal postpartum course  10/7/2024 2235 by Lita Beltrán RN  Outcome: Progressing  10/7/2024 0848 by Lios Sevilla RN  Outcome: Progressing  Goal: Incisions, wounds, or drain sites healing without S/S of infection  10/7/2024 2235 by Lita Beltrán RN  Outcome: Progressing  10/7/2024 0848 by Lois Sevilla RN  Outcome: Progressing     Problem: Pain  Goal: Verbalizes/displays adequate comfort level or baseline comfort level  10/7/2024 2235 by Lita Beltrán RN  Outcome: Progressing  Flowsheets (Taken 10/7/2024 2003)  Verbalizes/displays adequate comfort level or baseline comfort level: Encourage patient to monitor pain and request assistance  10/7/2024 0848 by Lois Sevilla RN  Outcome: Progressing     Problem: Infection - Adult  Goal: Absence of infection at discharge  10/7/2024 2235 by Lita Beltrán RN  Outcome: Progressing  10/7/2024 0848 by Lois Sevilla RN  Outcome: Progressing  Goal: Absence of infection during hospitalization  10/7/2024 2235 by Lita Beltrán RN  Outcome: Progressing  10/7/2024 0848 by Lois Sevilla RN  Outcome: Progressing  Goal: Absence of fever/infection during anticipated neutropenic period  10/7/2024 2235 by Lita Beltrán RN  Outcome: Progressing  10/7/2024 0848 by Lois Sevilla RN  Outcome: Progressing     Problem: Safety - Adult  Goal: Free from fall injury  10/7/2024 2235 by Lita Beltrán RN  Outcome: Progressing  10/7/2024 0848 by Lois Sevilla RN  Outcome: Progressing     Problem: Discharge Planning  Goal: Discharge to home or other facility with appropriate resources  10/7/2024 2235 by Lita Beltrán RN  Outcome: Progressing  10/7/2024 0848 by Lois Sevilla RN  Outcome: Progressing     Problem: Chronic Conditions and Co-morbidities  Goal: Patient's chronic conditions

## 2024-10-08 NOTE — ANESTHESIA POSTPROCEDURE EVALUATION
Department of Anesthesiology  Postprocedure Note    Patient: Nena Howell  MRN: [id number]  YOB: 2005  Date of evaluation: 10/8/2024    Procedure Summary       Date: 10/07/24 Room / Location: [facility] OR [address] [place] [address]    Anesthesia Start: 1012 Anesthesia Stop: 1147    Procedure:  SECTION (Abdomen) Diagnosis:       S/P repeat low transverse       (S/P repeat low transverse  [Z98.891])    Surgeons: Katlin Haynes DO Responsible Provider: Roel Yuong APRN - CRNA    Anesthesia Type: Spinal ASA Status: 3            Anesthesia Type: Spinal    Anson Phase I: Anson Score: 9    Anson Phase II:      Anesthesia Post Evaluation    Patient location during evaluation: bedside  Patient participation: complete - patient participated  Level of consciousness: awake  Pain score: 3  Airway patency: patent  Nausea & Vomiting: no nausea and no vomiting  Cardiovascular status: blood pressure returned to baseline and hemodynamically stable  Respiratory status: acceptable, room air and spontaneous ventilation  Hydration status: stable  Comments: Patient reports motor/sensory function have returned to normal. She has voided and walked. No back ache and no headache.  Pain management: adequate    No notable events documented.

## 2024-10-09 VITALS
RESPIRATION RATE: 18 BRPM | SYSTOLIC BLOOD PRESSURE: 112 MMHG | TEMPERATURE: 99 F | HEART RATE: 78 BPM | DIASTOLIC BLOOD PRESSURE: 71 MMHG | OXYGEN SATURATION: 97 %

## 2024-10-09 PROCEDURE — APPNB30 APP NON BILLABLE TIME 0-30 MINS

## 2024-10-09 PROCEDURE — 6370000000 HC RX 637 (ALT 250 FOR IP): Performed by: STUDENT IN AN ORGANIZED HEALTH CARE EDUCATION/TRAINING PROGRAM

## 2024-10-09 PROCEDURE — 6360000002 HC RX W HCPCS: Performed by: STUDENT IN AN ORGANIZED HEALTH CARE EDUCATION/TRAINING PROGRAM

## 2024-10-09 RX ORDER — IBUPROFEN 800 MG/1
800 TABLET, FILM COATED ORAL EVERY 8 HOURS
Qty: 120 TABLET | Refills: 3 | Status: SHIPPED | OUTPATIENT
Start: 2024-10-09

## 2024-10-09 RX ORDER — OXYCODONE HYDROCHLORIDE 5 MG/1
5 TABLET ORAL EVERY 6 HOURS PRN
Qty: 20 TABLET | Refills: 0 | Status: SHIPPED | OUTPATIENT
Start: 2024-10-09 | End: 2024-10-16

## 2024-10-09 RX ORDER — PSEUDOEPHEDRINE HCL 30 MG
100 TABLET ORAL 2 TIMES DAILY
Qty: 60 CAPSULE | Refills: 0 | Status: SHIPPED | OUTPATIENT
Start: 2024-10-09

## 2024-10-09 RX ADMIN — IBUPROFEN 800 MG: 800 TABLET, FILM COATED ORAL at 12:35

## 2024-10-09 RX ADMIN — IBUPROFEN 800 MG: 800 TABLET, FILM COATED ORAL at 02:23

## 2024-10-09 RX ADMIN — FAMOTIDINE 20 MG: 20 TABLET ORAL at 09:14

## 2024-10-09 RX ADMIN — DOCUSATE SODIUM 100 MG: 100 CAPSULE, LIQUID FILLED ORAL at 09:14

## 2024-10-09 RX ADMIN — ENOXAPARIN SODIUM 40 MG: 100 INJECTION SUBCUTANEOUS at 09:14

## 2024-10-09 RX ADMIN — ACETAMINOPHEN 1000 MG: 500 TABLET ORAL at 09:13

## 2024-10-09 ASSESSMENT — PAIN SCALES - GENERAL
PAINLEVEL_OUTOF10: 4
PAINLEVEL_OUTOF10: 2
PAINLEVEL_OUTOF10: 3

## 2024-10-09 ASSESSMENT — PAIN DESCRIPTION - LOCATION
LOCATION: ABDOMEN

## 2024-10-09 ASSESSMENT — PAIN DESCRIPTION - DESCRIPTORS
DESCRIPTORS: ACHING;CRAMPING
DESCRIPTORS: ACHING;CRAMPING
DESCRIPTORS: CRAMPING;DISCOMFORT;SORE

## 2024-10-09 ASSESSMENT — PAIN DESCRIPTION - ORIENTATION
ORIENTATION: ANTERIOR
ORIENTATION: LOWER
ORIENTATION: ANTERIOR

## 2024-10-09 ASSESSMENT — PAIN - FUNCTIONAL ASSESSMENT
PAIN_FUNCTIONAL_ASSESSMENT: ACTIVITIES ARE NOT PREVENTED

## 2024-10-09 ASSESSMENT — PAIN DESCRIPTION - ONSET: ONSET: GRADUAL

## 2024-10-09 ASSESSMENT — PAIN DESCRIPTION - FREQUENCY: FREQUENCY: INTERMITTENT

## 2024-10-09 NOTE — DISCHARGE SUMMARY
Obstetrical Discharge Form    Gestational Age:  39w3d    Antepartum complications:   Late care, NOB at 37wks   **Adoption arranged  Hx CS-LT with hemorrhage after delivery   Obesity (47.85), weekly BPPs  Hx cholecystectomy  +GBS swab  Needs pap at postpartum  O positive, HIV negative, RPR NR, Hep B neg, Hep C neg, Rubella immune  Questionable clubfoot on US    Date of Delivery:   10/7/2024      Type of Delivery:    for Repeat     Delivered By:    Dr Haynes             Baby:       Information for the patient's :  Evelyn Howell [[id number]]      Anesthesia:    Spinal    Intrapartum complications: Baby for adoption      Postpartum complications: none    Discharge Date:   10/9/2024    Condition of discharge:  good    /70   Pulse 69   Temp 97.9 °F (36.6 °C) (Oral)   Resp 18   SpO2 97%   Breastfeeding Unknown        Plan:     Discharge teaching completed including counseling on warning signs (heavy vaginal bleeding, s/s of preeclampsia, fever >100.4, ACHES, s/s of PPD).    Follow up    in 1 week(s)

## 2024-10-09 NOTE — CARE COORDINATION
Session Code: [id number]   ID: [id number]  MRN: [id number]    LSW met with mother of baby at bedside, asked adpotive parents to step out of the room for a private conversation. Mother of baby stated that she is feeling good and is wanting to go home today. LSW asked mother of baby if she is connected to any birth mom resources or support groups. Mother of baby stated that she is not, but that she would be interested in being connected to a support group. LSW placed resources on mother of baby's discharge instructions. LSW spoke with mother of baby about baby blues vs postpartum depression, discussed warning signs. Mother of baby verbalized understanding of above. No other questions or needs stated at this time.     LSW spoke with  from [facility] in [place].  stated that they have a support group on the second Tuesday of every month and the fourth Thursday of every month.  stated that they are able to use an  for mother of baby during the support groups.  also gave this LSW additional resources for other Bulgarian-speaking support groups in the area. LSW to follow.

## 2024-10-09 NOTE — PLAN OF CARE
Problem: Postpartum  Goal: Experiences normal postpartum course  Description:  Postpartum OB-Pregnancy care plan goal which identifies if the mother is experiencing a normal postpartum course  Outcome: Progressing  Goal: Incisions, wounds, or drain sites healing without S/S of infection  Outcome: Progressing     Problem: Pain  Goal: Verbalizes/displays adequate comfort level or baseline comfort level  Outcome: Progressing  Flowsheets (Taken 10/8/2024 2050)  Verbalizes/displays adequate comfort level or baseline comfort level:   Encourage patient to monitor pain and request assistance   Assess pain using appropriate pain scale   Administer analgesics based on type and severity of pain and evaluate response     Problem: Infection - Adult  Goal: Absence of infection at discharge  Outcome: Progressing  Goal: Absence of infection during hospitalization  Outcome: Progressing  Goal: Absence of fever/infection during anticipated neutropenic period  Outcome: Progressing     Problem: Safety - Adult  Goal: Free from fall injury  Outcome: Progressing     Problem: Discharge Planning  Goal: Discharge to home or other facility with appropriate resources  Outcome: Progressing     Problem: Chronic Conditions and Co-morbidities  Goal: Patient's chronic conditions and co-morbidity symptoms are monitored and maintained or improved  Outcome: Progressing

## 2024-10-09 NOTE — DISCHARGE INSTRUCTIONS
Grupos de apoyo para madres biológicas  [employer]  - Un shan de apoyo virtual para madres biológicas. El chucho alecia y cuarto jueves de cada mes. Las próximas reuniones son el 10 de Octubre y el 22 de Octubre. Llame al número principal para conectarse. Tienen intérpretes de español disponibles. [phone].     [employer]  - Un shan de apoyo virtual para madres biológicas. Regístrese en el shan de apoyo en el sitio web. [email or web address]   English

## 2024-10-09 NOTE — PLAN OF CARE
Problem: Postpartum  Goal: Experiences normal postpartum course  Description:  Postpartum OB-Pregnancy care plan goal which identifies if the mother is experiencing a normal postpartum course  Outcome: Completed  Goal: Incisions, wounds, or drain sites healing without S/S of infection  Outcome: Completed     Problem: Pain  Goal: Verbalizes/displays adequate comfort level or baseline comfort level  Outcome: Completed  Flowsheets (Taken 10/9/2024 4473)  Verbalizes/displays adequate comfort level or baseline comfort level: Encourage patient to monitor pain and request assistance     Problem: Infection - Adult  Goal: Absence of infection at discharge  Outcome: Completed  Goal: Absence of infection during hospitalization  Outcome: Completed  Goal: Absence of fever/infection during anticipated neutropenic period  Outcome: Completed     Problem: Safety - Adult  Goal: Free from fall injury  Outcome: Completed     Problem: Discharge Planning  Goal: Discharge to home or other facility with appropriate resources  Outcome: Completed     Problem: Chronic Conditions and Co-morbidities  Goal: Patient's chronic conditions and co-morbidity symptoms are monitored and maintained or improved  Outcome: Completed

## 2024-10-09 NOTE — PROGRESS NOTES
Department of Obstetrics and Gynecology  Labor and Delivery   Post Partum Progress Note      SUBJECTIVE:  Doing well with no complaints. Ambulating throughout room without dizziness or other s/s of anemia. Reports bleeding is decreasing and pain is well controlled with medication. Denies pain or discharge at incisional site. Has voided without difficulty. Has not had BM, + flatus. Eating and drinking well. Denies HA/visual changes/epigastric pain. Reports good social support. Denies emotional concerns at this time.     OBJECTIVE:      Vitals:  /70   Pulse 69   Temp 97.9 °F (36.6 °C) (Oral)   Resp 18   SpO2 97%   Breastfeeding Unknown   Lab Results   Component Value Date    WBC 12.4 (H) 10/08/2024    HGB 10.2 (L) 10/08/2024    HCT 32.3 (L) 10/08/2024    MCV 86.6 10/08/2024     10/08/2024       CONSTITUTIONAL: generally well-appearing.   ABDOMEN:  Soft, well contracted uterus. Fundus firm at u-1. C/s incision c/d/i. No erythema or discharge noted. BS present x 4 quadrants.   LOCHIA: Normal per pt  LUNGS: CTAB  HEART: RRR,   EXTREMITIES: No calf tenderness, erythema or swelling bilaterally       ASSESSMENT:      PPD # 1  S/p C/s  Baby for adoption  GBS +  RH O+    PLAN:     Will contniue with PP POC   Encourage PO hydration and early ambulation  Up to shower with instruction on incision care.  Will plan for discharge on PPD3.      Time Spent 15      JUSTIN Spann - ALEJOM

## 2024-10-16 ENCOUNTER — OFFICE VISIT (OUTPATIENT)
Dept: OBGYN | Age: 19
End: 2024-10-16

## 2024-10-16 VITALS
HEART RATE: 69 BPM | HEIGHT: 60 IN | DIASTOLIC BLOOD PRESSURE: 70 MMHG | SYSTOLIC BLOOD PRESSURE: 97 MMHG | WEIGHT: 229 LBS | BODY MASS INDEX: 44.96 KG/M2

## 2024-10-16 DIAGNOSIS — Z48.89 ENCOUNTER FOR POST SURGICAL WOUND CHECK: Primary | ICD-10-CM

## 2024-10-16 PROCEDURE — 99024 POSTOP FOLLOW-UP VISIT: CPT

## 2024-10-16 NOTE — PROGRESS NOTES
reviewed and are negative    BP 97/70 (Site: Left Upper Arm, Position: Sitting, Cuff Size: Medium Adult)   Pulse 69   Ht 1.524 m (5')   Wt 103.9 kg (229 lb)   BMI 44.72 kg/m²     Physical Exam  Vitals and nursing note reviewed.   Constitutional:       Appearance: Normal appearance. She is obese.   HENT:      Head: Normocephalic.   Pulmonary:      Effort: Pulmonary effort is normal.   Abdominal:          Comments: Superficial openings as above, no bleeding on exam   Musculoskeletal:         General: Normal range of motion.      Cervical back: Normal range of motion.   Skin:     General: Skin is warm and dry.   Neurological:      Mental Status: She is alert.   Psychiatric:         Mood and Affect: Mood normal.         No results found for this visit on 10/16/24.    ASSESSMENT AND PLAN   Diagnosis Orders   1. Encounter for post surgical wound check          LT incision healing appropriately. Continue to monitor and notify us of worsening bleeding or pain, any drainage, swelling, erythema, fevers chills.    Follow up as discussed.     Ramila Lerma PA-C

## 2024-10-29 PROBLEM — O09.33 LATE PRENATAL CARE AFFECTING PREGNANCY IN THIRD TRIMESTER: Status: RESOLVED | Noted: 2024-10-01 | Resolved: 2024-10-29

## 2024-10-29 PROBLEM — Z98.891 S/P REPEAT LOW TRANSVERSE C-SECTION: Status: RESOLVED | Noted: 2024-09-26 | Resolved: 2024-10-29

## 2024-10-29 PROBLEM — O09.93 SUPERVISION OF HIGH-RISK PREGNANCY, THIRD TRIMESTER: Status: RESOLVED | Noted: 2024-10-01 | Resolved: 2024-10-29

## 2024-10-29 PROBLEM — Z3A.39 39 WEEKS GESTATION OF PREGNANCY: Status: RESOLVED | Noted: 2024-10-07 | Resolved: 2024-10-29

## 2024-10-29 PROBLEM — O09.899 RUBELLA NON-IMMUNE STATUS, ANTEPARTUM: Status: RESOLVED | Noted: 2024-10-01 | Resolved: 2024-10-29

## 2024-10-29 PROBLEM — O99.210 OBESITY IN PREGNANCY: Status: RESOLVED | Noted: 2024-10-01 | Resolved: 2024-10-29

## 2024-10-29 PROBLEM — O99.820 GROUP B STREPTOCOCCAL CARRIAGE COMPLICATING PREGNANCY: Status: RESOLVED | Noted: 2024-10-01 | Resolved: 2024-10-29

## 2024-10-29 PROBLEM — Z28.39 RUBELLA NON-IMMUNE STATUS, ANTEPARTUM: Status: RESOLVED | Noted: 2024-10-01 | Resolved: 2024-10-29

## (undated) DEVICE — APPLICATOR MEDICATED 26 CC SOLUTION HI LT ORNG CHLORAPREP

## (undated) DEVICE — TOTAL TRAY, DB, 100% SILI FOLEY, 16FR 10: Brand: MEDLINE

## (undated) DEVICE — GARMENT,MEDLINE,DVT,INT,CALF,MED, GEN2: Brand: MEDLINE

## (undated) DEVICE — Device

## (undated) DEVICE — STRIP,CLOSURE,WOUND,MEDI-STRIP,1/2X4: Brand: MEDLINE

## (undated) DEVICE — ELECTRODE ES AD CRDLSS PT RET REM POLYHESIVE

## (undated) DEVICE — GOWN,ECLIPSE,POLYRNF,BRTHSLV,L,30/CS: Brand: MEDLINE

## (undated) DEVICE — BLOOD TRANSFER DEVICE: Brand: MEDLINE

## (undated) DEVICE — SUTURE VICRYL ABSRB BRAID COAT UD CTX 3-0 36IN  J980H

## (undated) DEVICE — STRIP SKIN CLSR W1XL5IN NYLON REINF CURAD STERIL

## (undated) DEVICE — DRESSING TRNSPAR W8XL12IN FLM SURESITE 123

## (undated) DEVICE — CLEANER,CAUTERY TIP,2X2",STERILE: Brand: MEDLINE

## (undated) DEVICE — TOWEL,OR,DSP,ST,BLUE,STD,6/PK,12PK/CS: Brand: MEDLINE

## (undated) DEVICE — 4-PORT MANIFOLD: Brand: NEPTUNE 2

## (undated) DEVICE — DRAPE SHEET ULTRAGARD: Brand: MEDLINE

## (undated) DEVICE — SPONGE GZ W4XL8IN COT WVN 12 PLY

## (undated) DEVICE — 3M™ STERI-STRIP™ COMPOUND BENZOIN TINCTURE 40 BAGS/CARTON 4 CARTONS/CASE C1544: Brand: 3M™ STERI-STRIP™

## (undated) DEVICE — SUTURE VICRYL SZ 1 L36IN ABSRB VLT L40MM CT 1/2 CIR J359H

## (undated) DEVICE — BABY BLANKET KIT: Brand: MEDLINE INDUSTRIES, INC.

## (undated) DEVICE — SUTURE MONOCRYL SZ 3-0 L27IN ABSRB UD L60MM KS STR REV CUT Y523H

## (undated) DEVICE — SUTURE VICRYL SZ 3-0 L36IN ABSRB UD L36MM CT-1 1/2 CIR J944H